# Patient Record
Sex: FEMALE | Race: AMERICAN INDIAN OR ALASKA NATIVE
[De-identification: names, ages, dates, MRNs, and addresses within clinical notes are randomized per-mention and may not be internally consistent; named-entity substitution may affect disease eponyms.]

---

## 2017-06-07 ENCOUNTER — HOSPITAL ENCOUNTER (EMERGENCY)
Dept: HOSPITAL 5 - ED | Age: 52
Discharge: HOME | End: 2017-06-07
Payer: MEDICAID

## 2017-06-07 VITALS — DIASTOLIC BLOOD PRESSURE: 84 MMHG | SYSTOLIC BLOOD PRESSURE: 141 MMHG

## 2017-06-07 DIAGNOSIS — Z88.0: ICD-10-CM

## 2017-06-07 DIAGNOSIS — M25.562: Primary | ICD-10-CM

## 2017-06-07 DIAGNOSIS — F42.9: ICD-10-CM

## 2017-06-07 DIAGNOSIS — Z88.8: ICD-10-CM

## 2017-06-07 DIAGNOSIS — M17.12: ICD-10-CM

## 2017-06-07 NOTE — EMERGENCY DEPARTMENT REPORT
ED Extremity Problem HPI





- General


Chief complaint: Extremity Injury, Lower


Stated complaint: LT KNEE PAIN


Time Seen by Provider: 06/07/17 12:34


Source: family


Mode of arrival: Ambulatory


Limitations: Language Barrier, Altered Mental Status





- History of Present Illness


Initial comments: 





patient is a 50 y/o female with h/o MR who presents due to left knee pain. 

Patient was brought in by her caregiver who states that she started c/o of left 

knee pain this morning. Patient's caregiver denies her having any falls or 

trauma to her knee. Patient is non verbal. Patient's caregiver states that she 

normally walks without any difficulty. 


MD Complaint: extremity pain


-: This morning


Location: left, knee


History of Same: No


Radiation: none





- Related Data


 Previous Rx's











 Medication  Instructions  Recorded  Last Taken  Type


 


Ibuprofen [Motrin 800 MG tab] 800 mg PO Q8HR PRN #30 tablet 06/07/17 Unknown Rx











 Allergies











Allergy/AdvReac Type Severity Reaction Status Date / Time


 


felbamate Allergy  Unknown Verified 06/07/17 10:58


 


Penicillins Allergy  Unknown Verified 06/07/17 10:58














ED Review of Systems


ROS: 


Stated complaint: LT KNEE PAIN


Other details as noted in HPI





Comment: All other systems reviewed and negative


Constitutional: no symptoms reported.  denies: chills, diaphoresis, fever, 

malaise, weakness


ENT: hearing loss.  denies: ear pain, throat pain, dental pain, epistaxis, 

congestion


Respiratory: no symptoms reported


Gastrointestinal: denies: abdominal pain, nausea, vomiting, diarrhea


Genitourinary: denies: urgency, dysuria, frequency


Musculoskeletal: arthralgia (left knee pain)


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





ED Past Medical Hx





- Past Medical History


Hx Psychiatric Treatment: Yes (OBSESSIVE COMPULSIVE DISORDER)


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 

RUBELLA SYNDROME





- Surgical History


Past Surgical History?: No





- Social History


Smoking Status: Never Smoker


Substance Use Type: None





- Medications


Home Medications: 


 Home Medications











 Medication  Instructions  Recorded  Confirmed  Last Taken  Type


 


Ibuprofen [Motrin 800 MG tab] 800 mg PO Q8HR PRN #30 tablet 06/07/17  Unknown Rx














ED Physical Exam





- General


Limitations: Language Barrier, Altered Mental Status


General appearance: alert, in no apparent distress





- Head


Head exam: Present: atraumatic, normocephalic, normal inspection





- Eye


Eye exam: Present: normal appearance





- Neck


Neck exam: Present: normal inspection, full ROM.  Absent: tenderness, 

meningismus, lymphadenopathy, thyromegaly





- Respiratory


Respiratory exam: Present: normal lung sounds bilaterally.  Absent: respiratory 

distress, wheezes, rales, chest wall tenderness





- Cardiovascular


Cardiovascular Exam: Present: regular rate, normal rhythm, normal heart sounds





- Expanded Lower Extremity Exam


  ** Left


Knee exam: Present: full ROM (pain with weight bearing on the left knee), 

swelling (mild swelling).  Absent: tenderness, abrasion, laceration, ecchymosis

, deformity, crepidus, dislocation, erythema, effusion, pain w/ pronation/

supination, posterior draw sign, pain/laxity with valgus


Lower Leg exam: Present: normal inspection, full ROM.  Absent: tenderness


Ankle exam: Present: normal inspection, full ROM.  Absent: tenderness


Foot/Toe exam: Present: normal inspection, full ROM.  Absent: tenderness


Neuro vascular tendon exam: Present: no vascular compromise


Gait: Positive: observed and limited by pain





- Back Exam


Back exam: Present: normal inspection, full ROM.  Absent: tenderness





- Neurological Exam


Neurological exam: Present: alert





- Skin


Skin exam: Present: warm, dry, intact





ED Course


 Vital Signs











  06/07/17





  10:58


 


Temperature 97.6 F


 


Pulse Rate 80


 


Respiratory 17





Rate 


 


Blood Pressure 141/84


 


O2 Sat by Pulse 99





Oximetry 














ED Medical Decision Making





- Radiology Data


Radiology results: report reviewed





X-ray of the left knee showed arthritic changes and narrowing of the left 

medial joint space, there was also narrowing of the patellofemoral joint.





- Medical Decision Making





Patient was in no acute distress, patient had FROM of the left knee, mild 

swelling, no erythema no ecchymosis.





Ace wrap was applied to the left knee.





X-ray of the left knee show no acute changes.


Patient was given information on follow-up with orthopedic specialist.  Patient 

was discharged with prescription for ibuprofen.  Caregiver was informed to 

follow up with an orthopedic specialist.





- Differential Diagnosis


knee strain, osteoarthritis, patellafemoral syndrome


Critical care attestation.: 


If time is entered above; I have spent that time in minutes in the direct care 

of this critically ill patient, excluding procedure time.








ED Disposition


Clinical Impression: 


Knee pain, acute


Qualifiers:


 Laterality: left Qualified Code(s): M25.562 - Pain in left knee





Osteoarthritis


Qualifiers:


 Osteoarthritis location: knee Osteoarthritis type: unspecified Laterality: 

left Qualified Code(s): M17.12 - Unilateral primary osteoarthritis, left knee





Disposition: DC-01 TO HOME OR SELFCARE


Is pt being admited?: No


Does the pt Need Aspirin: No


Condition: Good


Instructions:  Osteoarthritis (ED)


Additional Instructions: 


Take ibuprofen 800 mg every 8 hours as needed for pain.  Follow-up with 

provided orthopedic specialist for further evaluation of the left knee.


Prescriptions: 


Ibuprofen [Motrin 800 MG tab] 800 mg PO Q8HR PRN #30 tablet


 PRN Reason: Pain


Referrals: 


PRIMARY CARE,MD [Primary Care Provider] - 3-5 Days


TAWANA BLAIR MD [Staff Physician] - 3-5 Days


Time of Disposition: 13:21

## 2017-06-07 NOTE — XRAY REPORT
Left knee 3 views.



History: Injury with pain and swelling.



Findings: There is no evidence of fracture or other acute findings. 

There is marked narrowing of the knee joint space especially medially. 

There is narrowing of the patellofemoral joint.



Impression: Osteoarthritis.

## 2019-10-01 ENCOUNTER — HOSPITAL ENCOUNTER (OUTPATIENT)
Dept: HOSPITAL 5 - XRAY | Age: 54
Discharge: HOME | End: 2019-10-01
Attending: ORTHOPAEDIC SURGERY
Payer: MEDICAID

## 2019-10-01 DIAGNOSIS — M25.569: Primary | ICD-10-CM

## 2019-10-01 PROCEDURE — 73565 X-RAY EXAM OF KNEES: CPT

## 2019-10-01 NOTE — XRAY REPORT
BILATERAL KNEES STANDING, AP VIEW



INDICATION:  M25.569) PAIN IN UNSPECIFIED KNEE. 



COMPARISON: None.



IMPRESSION:  Normal bone mineralization.  No significant varus or valgus deformity is identified. The
 joint space in the right knee is normal. Moderate to severe osteoarthritic joint space narrowing is 
noted in the medial and lateral compartments of the left knee. No evidence for fracture, bone lesion 
or soft tissue abnormality.



Signer Name: Anthony Pardo Jr, MD 

Signed: 10/1/2019 2:22 PM

 Workstation Name: [id number]

## 2020-08-24 ENCOUNTER — TELEPHONE ENCOUNTER (OUTPATIENT)
Dept: URBAN - METROPOLITAN AREA CLINIC 92 | Facility: CLINIC | Age: 55
End: 2020-08-24

## 2020-08-24 RX ORDER — MELOXICAM 15 MG/1
TAKE 1 TABLET (15 MG) BY ORAL ROUTE ONCE DAILY TABLET ORAL 1
Qty: 0 | Refills: 0 | Status: ACTIVE | COMMUNITY
Start: 1900-01-01 | End: 1900-01-01

## 2020-08-24 RX ORDER — CLONIDINE HYDROCHLORIDE 0.1 MG/1
TABLET ORAL
Qty: 0 | Refills: 0 | Status: ACTIVE | COMMUNITY
Start: 1900-01-01 | End: 1900-01-01

## 2020-08-24 RX ORDER — CHLORPROMAZINE HYDROCHLORIDE 50 MG/1
TABLET, FILM COATED ORAL
Qty: 0 | Refills: 0 | Status: ACTIVE | COMMUNITY
Start: 1900-01-01 | End: 1900-01-01

## 2020-08-24 RX ORDER — FLUOXETINE HYDROCHLORIDE 40 MG/1
CAPSULE ORAL
Qty: 0 | Refills: 0 | Status: ACTIVE | COMMUNITY
Start: 1900-01-01 | End: 1900-01-01

## 2020-08-24 RX ORDER — BENZTROPINE MESYLATE 1 MG/1
TABLET ORAL
Qty: 0 | Refills: 0 | Status: ACTIVE | COMMUNITY
Start: 1900-01-01 | End: 1900-01-01

## 2020-08-24 RX ORDER — LINACLOTIDE 290 UG/1
1 CAPSULE AT LEAST 30 MINUTES BEFORE THE FIRST MEAL OF THE DAY ON AN EMPTY STOMACH CAPSULE, GELATIN COATED ORAL ONCE A DAY
Qty: 90 | Refills: 3 | OUTPATIENT
Start: 2020-08-24 | End: 2021-08-19

## 2022-07-13 ENCOUNTER — HOSPITAL ENCOUNTER (OUTPATIENT)
Dept: HOSPITAL 5 - XRAY | Age: 57
Discharge: HOME | End: 2022-07-13
Attending: ORTHOPAEDIC SURGERY
Payer: MEDICAID

## 2022-07-13 DIAGNOSIS — M25.462: ICD-10-CM

## 2022-07-13 DIAGNOSIS — M17.0: Primary | ICD-10-CM

## 2022-07-13 PROCEDURE — 73565 X-RAY EXAM OF KNEES: CPT

## 2022-07-14 NOTE — XRAY REPORT
BILATERAL KNEES AP STANDING



INDICATION:  M25.569 PAIN IN UNSPECIFIED KNEE. 



COMPARISON: None.



IMPRESSION:  AP view of the knees and lateral view of the left knee is provided. There are moderate t
o severe osteoarthritic changes throughout the left knee with the medial compartment being most affec
live. Trace left joint effusion.  There is perhaps minimal medial compartment narrowing in the right k
nee. No acute osseous abnormality or bone lesion.



Signer Name: Anthony Pardo Jr, MD 

Signed: 7/14/2022 7:42 AM

Workstation Name: [id number]

## 2025-08-27 ENCOUNTER — DASHBOARD ENCOUNTERS (OUTPATIENT)
Age: 60
End: 2025-08-27

## 2025-08-27 ENCOUNTER — OFFICE VISIT (OUTPATIENT)
Dept: URBAN - METROPOLITAN AREA CLINIC 109 | Facility: CLINIC | Age: 60
End: 2025-08-27
Payer: COMMERCIAL

## 2025-08-27 DIAGNOSIS — K59.09 CHRONIC CONSTIPATION: ICD-10-CM

## 2025-08-27 DIAGNOSIS — Z12.11 SCREENING FOR COLORECTAL CANCER: ICD-10-CM

## 2025-08-27 PROCEDURE — 99204 OFFICE O/P NEW MOD 45 MIN: CPT | Performed by: INTERNAL MEDICINE

## 2025-08-27 RX ORDER — HYDROGEN PEROXIDE 2.65 ML/100ML
AS DIRECTED LIQUID TOPICAL
Status: ACTIVE | COMMUNITY

## 2025-08-27 RX ORDER — LACTULOSE 10 G/15ML
15ML SOLUTION ORAL ONCE A DAY
Status: ACTIVE | COMMUNITY

## 2025-08-27 RX ORDER — LITHIUM CARBONATE 300 MG/1
1 CAPSULE AT BEDTIME CAPSULE, GELATIN COATED ORAL ONCE A DAY
Status: ACTIVE | COMMUNITY

## 2025-08-27 RX ORDER — FLUOXETINE HYDROCHLORIDE 40 MG/1
CAPSULE ORAL
Qty: 0 | Refills: 0 | Status: ACTIVE | COMMUNITY
Start: 1900-01-01

## 2025-08-27 RX ORDER — MIRTAZAPINE 30 MG/1
1 TABLET AT BEDTIME TABLET, FILM COATED ORAL ONCE A DAY
Status: ACTIVE | COMMUNITY

## 2025-08-27 RX ORDER — MELOXICAM 15 MG/1
TAKE 1 TABLET (15 MG) BY ORAL ROUTE ONCE DAILY TABLET ORAL 1
Qty: 0 | Refills: 0 | Status: ON HOLD | COMMUNITY
Start: 1900-01-01

## 2025-08-27 RX ORDER — CETIRIZINE HYDROCHLORIDE 10 MG/1
1 TABLET TABLET, FILM COATED ORAL ONCE A DAY
Status: ACTIVE | COMMUNITY

## 2025-08-27 RX ORDER — LORAZEPAM 1 MG/1
1 TABLET AT BEDTIME AS NEEDED TABLET ORAL ONCE A DAY
Status: ACTIVE | COMMUNITY

## 2025-08-27 RX ORDER — MAGNESIUM CITRATE 1.75 G/29.6ML
AS DIRECTED LIQUID ORAL
Status: ACTIVE | COMMUNITY

## 2025-08-27 RX ORDER — HYDROXYZINE HYDROCHLORIDE 10 MG/1
1 TABLET AS NEEDED TABLET, FILM COATED ORAL ONCE A DAY
Status: ACTIVE | COMMUNITY

## 2025-08-27 RX ORDER — CLONIDINE HYDROCHLORIDE 0.1 MG/1
TABLET ORAL
Qty: 0 | Refills: 0 | Status: ACTIVE | COMMUNITY
Start: 1900-01-01

## 2025-08-27 RX ORDER — CHLORPROMAZINE HYDROCHLORIDE 50 MG/1
TABLET, FILM COATED ORAL
Qty: 0 | Refills: 0 | Status: ACTIVE | COMMUNITY
Start: 1900-01-01

## 2025-08-27 RX ORDER — LINACLOTIDE 290 UG/1
1 CAPSULE AT LEAST 30 MINUTES BEFORE THE FIRST MEAL OF THE DAY ON AN EMPTY STOMACH CAPSULE, GELATIN COATED ORAL ONCE A DAY
Status: ACTIVE | COMMUNITY

## 2025-08-27 RX ORDER — BENZTROPINE MESYLATE 1 MG/1
TABLET ORAL
Qty: 0 | Refills: 0 | Status: ACTIVE | COMMUNITY
Start: 1900-01-01

## 2025-08-27 RX ORDER — CHLORHEXIDINE GLUCONATE 1.2 MG/ML
15 ML SWISH FOR 30 SECONDS, THEN SPIT.  DO NOT SWALLOW RINSE ORAL TWICE A DAY
Status: ACTIVE | COMMUNITY

## 2025-08-27 RX ORDER — DOCUSATE SODIUM 100 MG/1
1 CAPSULE AS NEEDED CAPSULE, LIQUID FILLED ORAL ONCE A DAY
Status: ACTIVE | COMMUNITY

## 2025-08-27 RX ORDER — LEVOTHYROXINE SODIUM 50 UG/1
1 TABLET IN THE MORNING ON AN EMPTY STOMACH TABLET ORAL ONCE A DAY
Status: ACTIVE | COMMUNITY

## 2025-08-27 RX ORDER — PROMETHAZINE HYDROCHLORIDE 25 MG/ML
AS DIRECTED INJECTION INTRAMUSCULAR; INTRAVENOUS
Status: ACTIVE | COMMUNITY